# Patient Record
Sex: MALE | NOT HISPANIC OR LATINO | URBAN - METROPOLITAN AREA
[De-identification: names, ages, dates, MRNs, and addresses within clinical notes are randomized per-mention and may not be internally consistent; named-entity substitution may affect disease eponyms.]

---

## 2021-12-17 ENCOUNTER — EMERGENCY (EMERGENCY)
Facility: HOSPITAL | Age: 26
LOS: 1 days | Discharge: HOME | End: 2021-12-17
Attending: EMERGENCY MEDICINE
Payer: COMMERCIAL

## 2021-12-17 VITALS
HEART RATE: 86 BPM | OXYGEN SATURATION: 98 % | TEMPERATURE: 98 F | DIASTOLIC BLOOD PRESSURE: 88 MMHG | WEIGHT: 235.01 LBS | RESPIRATION RATE: 17 BRPM | HEIGHT: 70 IN | SYSTOLIC BLOOD PRESSURE: 141 MMHG

## 2021-12-17 VITALS
DIASTOLIC BLOOD PRESSURE: 96 MMHG | RESPIRATION RATE: 18 BRPM | HEART RATE: 80 BPM | OXYGEN SATURATION: 97 % | SYSTOLIC BLOOD PRESSURE: 141 MMHG | TEMPERATURE: 98 F

## 2021-12-17 PROCEDURE — 99284 EMERGENCY DEPT VISIT MOD MDM: CPT

## 2021-12-17 PROCEDURE — 99283 EMERGENCY DEPT VISIT LOW MDM: CPT

## 2021-12-17 RX ORDER — ACETAMINOPHEN 500 MG
975 TABLET ORAL ONCE
Refills: 0 | Status: COMPLETED | OUTPATIENT
Start: 2021-12-17 | End: 2021-12-17

## 2021-12-17 RX ADMIN — Medication 975 MILLIGRAM(S): at 21:55
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
## 2021-12-17 NOTE — ED PROVIDER NOTE - PHYSICAL EXAMINATION
General: non-toxic, NAD  HEENT: NCAT, PERRL EOMI no periorbital ecchymosis or hernandez sign  Cardiac: RRR, no murmurs, 2+ peripheral pulses  Resp: CTAB no chest wall/sternal tenderness  Abdomen: soft, non-distended, bowel sounds present, no ttp, no rebound or guarding. no organomegaly  MSK: pelvis stable. no midline spinal tenderness. full ROM of ext x4. +ttp L 3rd MCP, full ROM. no deformity.   Skin: no rashes or ecchymosis.   Neuro: AAOx4, 5+motor, sensation grossly intact  Psych: mood and affect appropriate General: non-toxic, NAD  HEENT: NCAT, PERRL EOMI no periorbital ecchymosis or hernandez sign  Cardiac: RRR, no murmurs, 2+ peripheral pulses  Resp: CTAB no chest wall/sternal tenderness  Abdomen: soft, non-distended, bowel sounds present, no ttp, no rebound or guarding. no organomegaly  MSK: pelvis stable. no midline spinal tenderness. full ROM of ext x4. +ttp L 3rd MCP, full ROM. no deformity.   Skin: no rashes or ecchymosis.   Neuro: AAOx4, 5+motor, sensation grossly intact  Psych: mood and affect appropriate     attn - alert, nad, head - NC/AT, no facial tenderness, mandible and TMJ are NT, tongue - no trauma, PERRL 3 mm, nose - NT, no deformity or signs of epistaxis, neck/spine/back - min tenderness lower C-spine and left medial trap., Chest/ribs - NT, Lungs - clear, good BS bilaterally without splinting, Cor - RR, no M, Abdo - soft, NT, ND, no HSM or tenderness, no ecchymosis or signs of trauma, no CVAT, Pelvis/hips - NT, and no pain with AROM.  UExt - tenderness without swelling or deformity of left MCP middle finger, FROM, , LExt - FROM without pain, NT,  Neuro - CN, motor, sensory, cerebellar, speech intact and non focal

## 2021-12-17 NOTE — ED PROVIDER NOTE - NSFOLLOWUPCLINICS_GEN_ALL_ED_FT
[facility] [place]  Neurosurgery/Spine  [address], [address]  [place] [address]  Phone: [phone]  Fax:

## 2021-12-17 NOTE — ED ADULT NURSE REASSESSMENT NOTE - NS ED NURSE REASSESS COMMENT FT1
C-collar cleared by Yokasta DURON. Pt able to ambulate independently with no gait abnormalities or c/o dizziness/lightheadedness.

## 2021-12-17 NOTE — ED PROVIDER NOTE - NS ED ROS FT
Constitutional: no fevers, chills  HEENT: +HA, no vision changes, rhinorrhea, sore throat  Cardiac: no chest pain, palpitations  Respiratory: no SOB, cough or hemoptysis  GI: no n/v/d/c, abd pain, bloody or dark stools  : no dysuria, frequency, or hematuria  MSK: +L 3rd MCP pain, +neck pain +back pain  Skin: no rashes, jaundice, pruritis  Neuro: no numbness/tingling, weakness, unsteady gait  ROS otherwise negative except as per HPI
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
## 2021-12-19 NOTE — ED POST DISCHARGE NOTE - DETAILS
I returned patients call regarding paperwork for his job being filled out which excuses him from work for 3 days after emergency room visit. I offered to have patient fax us the paperwork. He states that he prefers to come in person and will come later today so we can sign paperwork in person. Hilaria Hill PA-C

## 2023-03-25 NOTE — ED PROVIDER NOTE - PATIENT PORTAL LINK FT
You can access the FollowMyHealth Patient Portal offered by [facility] by registering at the following website: [email or web address]. By joining [employer]’s FollowMyHealth portal, you will also be able to view your health information using other applications (apps) compatible with our system. Implemented All Universal Safety Interventions:  [place] to call system. Call bell, personal items and telephone within reach. Instruct patient to call for assistance. Room bathroom lighting operational. Non-slip footwear when patient is off stretcher. Physically safe environment: no spills, clutter or unnecessary equipment. Stretcher in lowest position, wheels locked, appropriate side rails in place.

## 2023-11-17 NOTE — ED PROVIDER NOTE - CHIEF COMPLAINT
CC: Diagnoses of Essential hypertension, Body mass index (BMI) 35.0-35.9, adult, Hypercholesterolemia, Prediabetes, and Screening for colon cancer were pertinent to this visit.    Subjective                                                                                                                                       HPI:   Arias presents today with the following concerns:    1. Essential hypertension  Chronic health problem, well controlled, continue with current meds and lifestyle.      2. Body mass index (BMI) 35.0-35.9, adult  Chronic health problem uncontrolled, patient has been able to stabilize his weight but not been successful with weight loss.  Were going to try and macrobiotic diet and see if utilizing that along with weight lifting can help him over the next 6 weeks.    3. Hypercholesterolemia  Chronic health problem, well controlled, continue with current meds and lifestyle.      4. Prediabetes  Chronic health problem, well controlled, continue with current meds and lifestyle.      5. Screening for colon cancer  Patient is due to be screened for colon cancer its been more than 10 years since his colonoscopy.       Patient Active Problem List    Diagnosis Date Noted    Morbid (severe) obesity due to excess calories (HCC) 03/31/2023    Acute bilateral low back pain without sciatica 11/04/2022    Acute left-sided back pain with sciatica 11/04/2022    Prediabetes 07/08/2022    History of malignant melanoma of skin 01/07/2022    Hypercholesterolemia 11/06/2019    Vitamin D deficiency 09/03/2019    Wellness examination 08/02/2019    Olecranon bursitis of left elbow 06/27/2018    Essential hypertension 05/11/2018    Body mass index (BMI) 35.0-35.9, adult        Current Outpatient Medications   Medication Sig Dispense Refill    rosuvastatin (CRESTOR) 5 MG Tab Take 1 Tablet by mouth every evening. 90 Tablet 3    amLODIPine (NORVASC) 5 MG Tab Take 1 Tablet by mouth every day. 90 Tablet 3     "acetaminophen (TYLENOL) 500 MG Tab Take 2 Tablets by mouth 3 times a day as needed (pain.  Do not exceed 3000 mg of total acetaminophen per day). 180 Tablet 6    losartan (COZAAR) 100 MG Tab TAKE ONE TABLET BY MOUTH EVERY DAY 90 Tablet 3    Cholecalciferol (VITAMIN D3) 125 MCG (5000 UT) Cap Take 1 Cap by mouth every day. 30 Cap      No current facility-administered medications for this visit.         Allergies as of 11/17/2023 - Reviewed 11/17/2023   Allergen Reaction Noted    Ace inhibitors Cough 08/02/2019          Objective      /80 (BP Location: Right arm, Patient Position: Sitting, BP Cuff Size: Adult)   Pulse 84   Temp 36.4 °C (97.6 °F) (Temporal)   Resp 12   Ht 1.803 m (5' 11\")   Wt 114 kg (252 lb)   SpO2 96%   BMI 35.15 kg/m²     Physical Exam:  Gen:         Alert and oriented, No apparent distress.  Patient was seen for 30 minutes face to face of which, 30 minutes was spent counseling regarding trying to change his diet and exercise program to include more macro tracking and weightlifting 5 days a week.  Also went over his labs and discussed current medications.    LABS: 11/13/2023: Results reviewed and discussed with the patient, questions answered.      Assessment & Plan    60 y.o. male with the following issues:    Essential hypertension  Is a chronic health problem for which the patient is on losartan 100 mg daily along with amlodipine 5 mg daily.  His blood pressure today is 132/80.  He is fairly frustrated with not being able to lose weight.  We had a long discussion regarding this.  Were going to make some lifestyle changes and see him back in 6 weeks hopefully with sustained weight loss.    Body mass index (BMI) 35.0-35.9, adult  This is a chronic health problem for this patient that is very frustrating to him.  He has a bad back and if he does not lose some of his abdominal adiposity he is going to end up continuing to have a bad back.  We talked about putting him on a macrobiotic diet " so he is going to start at 150 g of protein, 135 g of carbohydrate and 85 g of fat.  He will keep track on my fitness pal.  He will weight lift 5 days a week and we will plan to see him back in 6 weeks hopefully with about a 10 pound weight loss.  He is starting at 252.    Hypercholesterolemia  This is a chronic health problem for this patient for which she is on rosuvastatin 5 mg daily.  He is done a great job with keeping his cholesterol under control.  Total cholesterol is down to 137, triglycerides 105, HDL 47 LDL is 69.  There is no liver dysfunction.  He will continue with current meds.    Prediabetes  This is a chronic health problem that I am hopeful we will see improvement in with him working diligently on weight loss.  His fasting glucose was barely elevated at 102 and his A1c is barely elevated at 6.0.  We will continue to monitor.        The patient is a 26y Male complaining of

## 2024-07-29 ENCOUNTER — EMERGENCY (EMERGENCY)
Facility: HOSPITAL | Age: 29
LOS: 1 days | Discharge: HOME | End: 2024-07-29
Attending: EMERGENCY MEDICINE | Admitting: EMERGENCY MEDICINE
Payer: COMMERCIAL

## 2024-07-29 VITALS
WEIGHT: 225.09 LBS | HEART RATE: 88 BPM | TEMPERATURE: 98 F | SYSTOLIC BLOOD PRESSURE: 139 MMHG | RESPIRATION RATE: 16 BRPM | OXYGEN SATURATION: 100 % | DIASTOLIC BLOOD PRESSURE: 93 MMHG

## 2024-07-29 RX ORDER — ACETAMINOPHEN 325 MG
650 TABLET ORAL ONCE
Refills: 0 | Status: COMPLETED | OUTPATIENT
Start: 2024-07-29 | End: 2024-07-29

## 2024-07-29 RX ADMIN — Medication 650 MILLIGRAM(S): at 12:28
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
## 2024-07-29 NOTE — ED PROVIDER NOTE - PATIENT PORTAL LINK FT
You can access the FollowMyHealth Patient Portal offered by [facility] by registering at the following website: [email or web address]. By joining [employer]’s FollowMyHealth portal, you will also be able to view your health information using other applications (apps) compatible with our system.
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
## 2024-07-29 NOTE — ED ADULT TRIAGE NOTE - AS TEMP SITE
Spoke with Mrs. Shearer and scheduled follow up in 3 months and labs----- Message from Lizett Crane PA-C sent at 4/12/2023  4:07 PM CDT -----  Call patient scheduel labs and follow-up in 3 months. Thanks!   oral
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
## 2024-07-29 NOTE — ED PROVIDER NOTE - PHYSICAL EXAMINATION
Gen: NAD, non-toxic appearing  Head: normal appearing  Abd: soft, non distended, non tender   MSK: no visible deformities, no midline spinal tenderness,  L UE: TTP at AC joint, pain with ROM, worst with abduction, no bruising, no visible deformity  Neuro: No focal deficits, AAOx3  Skin: Warm  Psych: normal affect
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
## 2024-07-30 RX ORDER — ACETAMINOPHEN 325 MG
2 TABLET ORAL
Qty: 16 | Refills: 0
Start: 2024-07-30 | End: 2024-07-31

## 2024-07-30 NOTE — ED POST DISCHARGE NOTE - ADDITIONAL DOCUMENTATION
ZHANE Rodriguez: Pt called requesting rx for Tylenol be sent to his pharmacy, he was given tylenol yesterday while in the ED. Rx sent, pt will return to the ER with any worsening or concerning symptoms

## 2025-01-13 ENCOUNTER — EMERGENCY (EMERGENCY)
Facility: HOSPITAL | Age: 30
LOS: 1 days | Discharge: HOME | End: 2025-01-13
Admitting: EMERGENCY MEDICINE
Payer: COMMERCIAL

## 2025-01-13 VITALS
HEART RATE: 80 BPM | OXYGEN SATURATION: 98 % | RESPIRATION RATE: 15 BRPM | SYSTOLIC BLOOD PRESSURE: 134 MMHG | TEMPERATURE: 98 F | DIASTOLIC BLOOD PRESSURE: 82 MMHG

## 2025-01-13 VITALS
DIASTOLIC BLOOD PRESSURE: 87 MMHG | OXYGEN SATURATION: 97 % | TEMPERATURE: 99 F | SYSTOLIC BLOOD PRESSURE: 147 MMHG | RESPIRATION RATE: 18 BRPM | WEIGHT: 265 LBS | HEART RATE: 93 BPM

## 2025-01-13 PROCEDURE — 70450 CT HEAD/BRAIN W/O DYE: CPT | Mod: 26

## 2025-01-13 PROCEDURE — 99284 EMERGENCY DEPT VISIT MOD MDM: CPT

## 2025-01-13 PROCEDURE — 73564 X-RAY EXAM KNEE 4 OR MORE: CPT | Mod: 26,LT

## 2025-01-13 RX ORDER — LIDOCAINE 50 MG/G
1 OINTMENT TOPICAL ONCE
Refills: 0 | Status: COMPLETED | OUTPATIENT
Start: 2025-01-13 | End: 2025-01-13

## 2025-01-13 RX ORDER — METHOCARBAMOL 500 MG
2 TABLET ORAL
Qty: 30 | Refills: 0
Start: 2025-01-13 | End: 2025-01-17

## 2025-01-13 RX ORDER — IBUPROFEN 200 MG
600 TABLET ORAL ONCE
Refills: 0 | Status: COMPLETED | OUTPATIENT
Start: 2025-01-13 | End: 2025-01-13

## 2025-01-13 RX ORDER — LIDOCAINE 50 MG/G
1 OINTMENT TOPICAL
Qty: 1 | Refills: 0
Start: 2025-01-13 | End: 2025-01-22

## 2025-01-13 RX ORDER — IBUPROFEN 200 MG
1 TABLET ORAL
Qty: 21 | Refills: 0
Start: 2025-01-13 | End: 2025-01-19

## 2025-01-13 RX ORDER — DIAZEPAM 5 MG
5 TABLET ORAL ONCE
Refills: 0 | Status: DISCONTINUED | OUTPATIENT
Start: 2025-01-13 | End: 2025-01-13

## 2025-01-13 RX ADMIN — LIDOCAINE 1 PATCH: 50 OINTMENT TOPICAL at 21:40

## 2025-01-13 RX ADMIN — Medication 600 MILLIGRAM(S): at 21:39

## 2025-01-13 RX ADMIN — Medication 5 MILLIGRAM(S): at 21:39
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
## 2025-01-13 NOTE — ED PROVIDER NOTE - PROGRESS NOTE DETAILS
XR knee with no acute findings. Pt pending CT head. Pt reports pain has improved from initial 8/10 to now 5/10.

## 2025-01-13 NOTE — ED PROVIDER NOTE - PATIENT PORTAL LINK FT
You can access the FollowMyHealth Patient Portal offered by [facility] by registering at the following website: [email or web address]. By joining [employer]’s FollowMyHealth portal, you will also be able to view your health information using other applications (apps) compatible with our system.

## 2025-01-13 NOTE — ED PROVIDER NOTE - PHYSICAL EXAMINATION
General: Well appearing in no acute distress, alert and cooperative  Head: Normocephalic, atraumatic. No palpable scalp hematomas or areas of scalp tenderness  Eyes: PERRLA  Neck: Soft and supple, full ROM without pain, no midline tenderness. No paraspinal cervical tenderness to palpation bilaterally  Cardiac: Regular rate and regular rhythm, no murmurs  Resp: Unlabored respiratory effort, lungs CTAB, speaking in full sentences, no wheezes  Abd: Soft, non-tender, non-distended, no guarding or rebound tenderness  MSK: Spine midline and non-tender. Tenderness to palpation over lower thoracic bilateral paraspinal regions, no midline spinal tenderness at this area.  No step-offs or deformities noted.               Some tenderness to palpation over the lateral aspect of the left knee.  Patella stable with no laxity without tenderness to palpation.  Decreased flexion of left knee secondary to pain.  No overlying swelling, ecchymosis, warmth, erythema.DP pulses 2+ and symmetric in bilateral lower extremities.  Motor strength 5 out of 5 in bilateral lower extremities.  Sensation grossly intact.                 Pelvis appears stable with no laxity.  No chest wall tenderness to palpation.  No tenderness to the patient over bilateral upper extremities.  Skin: Warm and dry, no rashes/abrasions/lacerations  Neuro: AO x 3, moves all extremities symmetrically, Motor strength 5/5 bilaterally UE and LE, sensation grossly intact. Normal gait. Speech is fluent and appropriate.  CN III to XII intact.  No facial droop or pronator drift.  No ataxia–rapid alternating movements intact.
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
